# Patient Record
Sex: MALE | URBAN - METROPOLITAN AREA
[De-identification: names, ages, dates, MRNs, and addresses within clinical notes are randomized per-mention and may not be internally consistent; named-entity substitution may affect disease eponyms.]

---

## 2017-01-10 ENCOUNTER — IMPORTED ENCOUNTER (OUTPATIENT)
Dept: URBAN - METROPOLITAN AREA CLINIC 50 | Facility: CLINIC | Age: 72
End: 2017-01-10

## 2017-01-10 NOTE — PATIENT DISCUSSION
"""Cataract(s) are not visually significant for cataract surgery.  Monitor by regular examinations. """

## 2018-01-16 ENCOUNTER — IMPORTED ENCOUNTER (OUTPATIENT)
Dept: URBAN - METROPOLITAN AREA CLINIC 50 | Facility: CLINIC | Age: 73
End: 2018-01-16

## 2018-03-21 ENCOUNTER — IMPORTED ENCOUNTER (OUTPATIENT)
Dept: URBAN - METROPOLITAN AREA CLINIC 50 | Facility: CLINIC | Age: 73
End: 2018-03-21

## 2019-01-15 ENCOUNTER — IMPORTED ENCOUNTER (OUTPATIENT)
Dept: URBAN - METROPOLITAN AREA CLINIC 50 | Facility: CLINIC | Age: 74
End: 2019-01-15

## 2020-01-21 ENCOUNTER — IMPORTED ENCOUNTER (OUTPATIENT)
Dept: URBAN - METROPOLITAN AREA CLINIC 50 | Facility: CLINIC | Age: 75
End: 2020-01-21

## 2020-02-11 ENCOUNTER — IMPORTED ENCOUNTER (OUTPATIENT)
Dept: URBAN - METROPOLITAN AREA CLINIC 50 | Facility: CLINIC | Age: 75
End: 2020-02-11

## 2020-02-18 ENCOUNTER — IMPORTED ENCOUNTER (OUTPATIENT)
Dept: URBAN - METROPOLITAN AREA CLINIC 50 | Facility: CLINIC | Age: 75
End: 2020-02-18

## 2021-01-28 ENCOUNTER — IMPORTED ENCOUNTER (OUTPATIENT)
Dept: URBAN - METROPOLITAN AREA CLINIC 50 | Facility: CLINIC | Age: 76
End: 2021-01-28

## 2021-05-15 ASSESSMENT — VISUAL ACUITY
OD_CC: J1+
OD_CC: 20/20-2
OD_CC: 20/20
OD_PH: @ 17 IN
OS_CC: J1+
OS_CC: J1+
OS_CC: 20/25
OD_BAT: 20/40
OS_PH: 20/30
OS_OTHER: 20/40. 20/50-2.
OD_CC: J1+
OD_OTHER: 20/25. 20/30.
OS_CC: J1+
OS_PH: 20/25
OD_OTHER: 20/400.
OS_BAT: 20/25
OD_BAT: 20/25
OD_CC: J1+
OS_CC: 20/30
OD_CC: 20/25
OS_CC: 20/20
OD_CC: 20/20
OS_BAT: 20/40
OS_CC: 20/40
OD_CC: 20/25
OD_CC: J1+
OD_CC: 20/25
OS_PH: 20/30
OD_BAT: 20/400
OS_CC: J1+
OS_OTHER: 20/25. 20/30.
OS_CC: 20/40
OS_OTHER: 20/30. 20/30.
OS_BAT: 20/30
OS_CC: 20/25
OS_PH: @ 17 IN
OS_CC: 20/25
OD_CC: 20/20
OD_OTHER: 20/40. 20/40.

## 2021-05-15 ASSESSMENT — TONOMETRY
OD_IOP_MMHG: 20
OS_IOP_MMHG: 18
OD_IOP_MMHG: 18
OS_IOP_MMHG: 19
OS_IOP_MMHG: 19
OD_IOP_MMHG: 19
OS_IOP_MMHG: 18
OS_IOP_MMHG: 19
OD_IOP_MMHG: 19
OD_IOP_MMHG: 20
OD_IOP_MMHG: 20
OS_IOP_MMHG: 20

## 2022-01-19 ENCOUNTER — PREPPED CHART (OUTPATIENT)
Dept: URBAN - METROPOLITAN AREA CLINIC 49 | Facility: CLINIC | Age: 77
End: 2022-01-19

## 2022-01-26 ENCOUNTER — ESTABLISHED PATIENT (OUTPATIENT)
Dept: URBAN - METROPOLITAN AREA CLINIC 49 | Facility: CLINIC | Age: 77
End: 2022-01-26

## 2022-01-26 DIAGNOSIS — H35.372: ICD-10-CM

## 2022-01-26 DIAGNOSIS — H40.053: ICD-10-CM

## 2022-01-26 DIAGNOSIS — H25.13: ICD-10-CM

## 2022-01-26 DIAGNOSIS — H43.812: ICD-10-CM

## 2022-01-26 PROCEDURE — 76514 ECHO EXAM OF EYE THICKNESS: CPT

## 2022-01-26 PROCEDURE — 92134 CPTRZ OPH DX IMG PST SGM RTA: CPT

## 2022-01-26 PROCEDURE — 92014 COMPRE OPH EXAM EST PT 1/>: CPT

## 2022-01-26 ASSESSMENT — VISUAL ACUITY
OS_GLARE: 20/40
OU_CC: J1+@17"
OD_CC: 20/20-2
OD_GLARE: 20/40
OS_GLARE: 20/60
OS_CC: 20/25-2
OD_GLARE: 20/25

## 2022-01-26 ASSESSMENT — PACHYMETRY
OD_CT_UM: 574
OS_CT_UM: 586

## 2022-01-26 ASSESSMENT — TONOMETRY
OS_IOP_MMHG: 18
OD_IOP_MMHG: 20
OD_IOP_MMHG: 22
OS_IOP_MMHG: 21

## 2022-01-26 NOTE — PATIENT DISCUSSION
IOP slightly elevated but within normal limits OU. Patient states IOP used to run a lot higher in the past but has been much lower and have been stable. No further testing recommended.

## 2023-12-20 ENCOUNTER — DIAGNOSTICS ONLY (OUTPATIENT)
Dept: URBAN - METROPOLITAN AREA CLINIC 49 | Facility: LOCATION | Age: 78
End: 2023-12-20

## 2023-12-20 DIAGNOSIS — H40.053: ICD-10-CM

## 2023-12-20 PROCEDURE — 92133 CPTRZD OPH DX IMG PST SGM ON: CPT

## 2023-12-20 PROCEDURE — 92083 EXTENDED VISUAL FIELD XM: CPT

## 2024-02-07 ENCOUNTER — COMPREHENSIVE EXAM (OUTPATIENT)
Dept: URBAN - METROPOLITAN AREA CLINIC 49 | Facility: LOCATION | Age: 79
End: 2024-02-07

## 2024-02-07 DIAGNOSIS — H25.13: ICD-10-CM

## 2024-02-07 DIAGNOSIS — H40.053: ICD-10-CM

## 2024-02-07 DIAGNOSIS — H35.373: ICD-10-CM

## 2024-02-07 DIAGNOSIS — H43.812: ICD-10-CM

## 2024-02-07 DIAGNOSIS — H04.123: ICD-10-CM

## 2024-02-07 DIAGNOSIS — H35.033: ICD-10-CM

## 2024-02-07 PROCEDURE — 92134 CPTRZ OPH DX IMG PST SGM RTA: CPT

## 2024-02-07 PROCEDURE — 99214 OFFICE O/P EST MOD 30 MIN: CPT

## 2024-02-07 ASSESSMENT — VISUAL ACUITY
OS_GLARE: 20/25
OS_GLARE: 20/40
OD_GLARE: 20/40
OU_CC: J1+
OS_CC: 20/25
OD_GLARE: 20/30
OD_CC: 20/25

## 2024-02-07 ASSESSMENT — KERATOMETRY
OD_K1POWER_DIOPTERS: 44.00
OS_AXISANGLE_DEGREES: 0
OD_AXISANGLE_DEGREES: 150
OD_K2POWER_DIOPTERS: 44.25
OS_K2POWER_DIOPTERS: 44.50
OS_K1POWER_DIOPTERS: 44.50
OS_AXISANGLE2_DEGREES: 90
OD_AXISANGLE2_DEGREES: 60

## 2024-02-07 ASSESSMENT — TONOMETRY
OS_IOP_MMHG: 17
OD_IOP_MMHG: 20
OD_IOP_MMHG: 18
OS_IOP_MMHG: 20

## 2025-01-30 ENCOUNTER — COMPREHENSIVE EXAM (OUTPATIENT)
Age: 80
End: 2025-01-30

## 2025-01-30 DIAGNOSIS — H35.033: ICD-10-CM

## 2025-01-30 DIAGNOSIS — H25.13: ICD-10-CM

## 2025-01-30 DIAGNOSIS — H35.373: ICD-10-CM

## 2025-01-30 DIAGNOSIS — H43.812: ICD-10-CM

## 2025-01-30 DIAGNOSIS — H02.834: ICD-10-CM

## 2025-01-30 DIAGNOSIS — H02.831: ICD-10-CM

## 2025-01-30 DIAGNOSIS — H04.123: ICD-10-CM

## 2025-01-30 DIAGNOSIS — H40.053: ICD-10-CM

## 2025-01-30 PROCEDURE — 92134 CPTRZ OPH DX IMG PST SGM RTA: CPT

## 2025-01-30 PROCEDURE — 99214 OFFICE O/P EST MOD 30 MIN: CPT
